# Patient Record
Sex: FEMALE | URBAN - METROPOLITAN AREA
[De-identification: names, ages, dates, MRNs, and addresses within clinical notes are randomized per-mention and may not be internally consistent; named-entity substitution may affect disease eponyms.]

---

## 2022-08-19 ENCOUNTER — EMERGENCY (EMERGENCY)
Facility: HOSPITAL | Age: 42
LOS: 1 days | Discharge: HOME | End: 2022-08-19
Attending: EMERGENCY MEDICINE | Admitting: EMERGENCY MEDICINE
Payer: MEDICAID

## 2022-08-19 VITALS
RESPIRATION RATE: 18 BRPM | TEMPERATURE: 98 F | OXYGEN SATURATION: 97 % | DIASTOLIC BLOOD PRESSURE: 75 MMHG | SYSTOLIC BLOOD PRESSURE: 110 MMHG | HEART RATE: 60 BPM

## 2022-08-19 VITALS
TEMPERATURE: 98 F | RESPIRATION RATE: 18 BRPM | HEIGHT: 70 IN | OXYGEN SATURATION: 98 % | WEIGHT: 264.11 LBS | HEART RATE: 64 BPM | SYSTOLIC BLOOD PRESSURE: 126 MMHG | DIASTOLIC BLOOD PRESSURE: 86 MMHG

## 2022-08-19 PROCEDURE — 99283 EMERGENCY DEPT VISIT LOW MDM: CPT | Mod: 25

## 2022-08-19 PROCEDURE — 73080 X-RAY EXAM OF ELBOW: CPT | Mod: 26,LT

## 2022-08-19 PROCEDURE — 99284 EMERGENCY DEPT VISIT MOD MDM: CPT

## 2022-08-19 PROCEDURE — 73080 X-RAY EXAM OF ELBOW: CPT

## 2022-08-19 RX ORDER — IBUPROFEN 200 MG
600 TABLET ORAL ONCE
Refills: 0 | Status: COMPLETED | OUTPATIENT
Start: 2022-08-19 | End: 2022-08-19

## 2022-08-19 RX ADMIN — Medication 600 MILLIGRAM(S): at 09:50

## 2022-08-19 RX ADMIN — Medication 600 MILLIGRAM(S): at 10:35

## 2022-08-19 NOTE — ED ADULT NURSE NOTE - NSIMPLEMENTINTERV_GEN_ALL_ED
Implemented All Fall Risk Interventions:  [place] to call system. Call bell, personal items and telephone within reach. Instruct patient to call for assistance. Room bathroom lighting operational. Non-slip footwear when patient is off stretcher. Physically safe environment: no spills, clutter or unnecessary equipment. Stretcher in lowest position, wheels locked, appropriate side rails in place. Provide visual cue, wrist band, yellow gown, etc. Monitor gait and stability. Monitor for mental status changes and reorient to person, place, and time. Review medications for side effects contributing to fall risk. Reinforce activity limits and safety measures with patient and family.

## 2022-08-19 NOTE — ED PROVIDER NOTE - CHPI ED SYMPTOMS NEG
no abrasion/no back pain/no numbness/no stiffness/no tingling/no weakness/no difficulty bearing weight

## 2022-08-19 NOTE — ED PROVIDER NOTE - ENMT, MLM
Airway patent, Nasal mucosa clear. Mouth with normal mucosa. Throat has no vesicles, no oropharyngeal exudates and uvula is midline. Scalp is atraumatic nontender.

## 2022-08-19 NOTE — ED PROVIDER NOTE - MUSCULOSKELETAL, MLM
Minor tenderness left elbow, no bony deformity.  Full range of motion pulses and sensation intact.  Remainder of exam is unremarkable

## 2022-08-19 NOTE — ED PROVIDER NOTE - OBJECTIVE STATEMENT
42-year-old female complaining of left elbow injury after a fall today.  States she tripped over her own feet.  Denies other injury or symptom.

## 2022-08-19 NOTE — ED PROVIDER NOTE - NSFOLLOWUPINSTRUCTIONS_ED_ALL_ED_FT
Elbow Fracture    WHAT YOU NEED TO KNOW:    An elbow fracture is a break in one or more of the 3 bones that form your elbow joint. Osteoporosis (brittle bones) can increase your risk for an elbow fracture.  Adult Arm Bones         DISCHARGE INSTRUCTIONS:    Return to the emergency department if:   •Your skin becomes swollen, cold, or pale.      •Your elbow, hand, or fingers are numb.      Call your doctor if:   •You have a fever.      •The pain gets worse, even after you rest and take your medicine.      •You have new or more trouble moving your arm.      •You have new sores around the area of your brace, splint, or cast.      •Your brace, splint, or cast becomes damaged.      •You have questions or concerns about your condition or care.      Medicines: You may need any of the following:   •Prescription pain medicine may be given. Ask your healthcare provider how to take this medicine safely. Some prescription pain medicines contain acetaminophen. Do not take other medicines that contain acetaminophen without talking to your healthcare provider. Too much acetaminophen may cause liver damage. Prescription pain medicine may cause constipation. Ask your healthcare provider how to prevent or treat constipation.       •NSAIDs, such as ibuprofen, help decrease swelling, pain, and fever. This medicine is available with or without a doctor's order. NSAIDs can cause stomach bleeding or kidney problems in certain people. If you take blood thinner medicine, always ask your healthcare provider if NSAIDs are safe for you. Always read the medicine label and follow directions.      •Take your medicine as directed. Contact your healthcare provider if you think your medicine is not helping or if you have side effects. Tell him or her if you are allergic to any medicine. Keep a list of the medicines, vitamins, and herbs you take. Include the amounts, and when and why you take them. Bring the list or the pill bottles to follow-up visits. Carry your medicine list with you in case of an emergency.      Self-care:   •Elevate your elbow above the level of your heart as often as you can. This will help decrease swelling and pain. Prop your elbow on pillows or blankets to keep it elevated comfortably. While your elbow is elevated, wiggle your fingers and open and close them to prevent hand stiffness.             •Apply ice on your elbow on your elbow for 15 to 20 minutes every hour or as directed. Use an ice pack, or put crushed ice in a plastic bag. Cover it with a towel. Ice helps prevent tissue damage and decreases swelling and pain.      •Go to physical therapy as directed. A physical therapist can teach you exercises to help improve movement and strength and to decrease pain.      Care for your brace, cast, or splint: Follow instructions about when you may take a bath or shower. It is important not to get your brace, cast, or splint wet. Cover your device with a plastic bag before you bathe. Tape the bag to your skin above the device to help keep out water. Hold your elbow away from the water in case the bag breaks.  •Check the skin around your cast, brace, or splint daily for any redness or open skin.      •Do not use a sharp or pointed object to scratch your skin under the cast, brace, or splint.      •Do not remove your brace or splint unless directed.      Follow up with your doctor as directed: You may need to see a specialist. You may need more x-rays and treatment. Write down your questions so you remember to ask them during your visits.

## 2022-08-19 NOTE — ED ADULT TRIAGE NOTE - CHIEF COMPLAINT QUOTE
tripped and fell injured left elbow right hand dominant. ring removed and given to pt and placed on other hand

## 2022-08-19 NOTE — ED PROVIDER NOTE - CARE PROVIDER_API CALL
Jeremiah Sanchez (DO)  Orthopaedic Surgery  [address] [place] [address]  Phone: [phone]  Fax: [phone]  Follow Up Time: 1-3 Days

## 2022-08-19 NOTE — ED ADULT NURSE NOTE - OBJECTIVE STATEMENT
42 YOF A&OX3 presents to ED for left elbow and right knee injury s/p fall. pt states tripped over her feet. pt rates left elbow pain 7/10. no numbness/tingling of extremities. pulses present in all 4 extremities, skin clean dry and normal for race. fall precautions in place. safety maintained.

## 2022-08-19 NOTE — ED PROVIDER NOTE - CLINICAL SUMMARY MEDICAL DECISION MAKING FREE TEXT BOX
42 female fell at home with left elbow injury.  Rule out fracture.  Plan x-ray, ice pack, sling, Ortho follow-up.

## 2022-08-20 ENCOUNTER — APPOINTMENT (OUTPATIENT)
Dept: ORTHOPEDIC SURGERY | Facility: CLINIC | Age: 42
End: 2022-08-20

## 2022-08-20 VITALS — HEIGHT: 70 IN | BODY MASS INDEX: 31.5 KG/M2 | WEIGHT: 220 LBS

## 2022-08-20 DIAGNOSIS — S52.125A NONDISPLACED FRACTURE OF HEAD OF LEFT RADIUS, INITIAL ENCOUNTER FOR CLOSED FRACTURE: ICD-10-CM

## 2022-08-20 DIAGNOSIS — Z78.9 OTHER SPECIFIED HEALTH STATUS: ICD-10-CM

## 2022-08-20 PROBLEM — Z00.00 ENCOUNTER FOR PREVENTIVE HEALTH EXAMINATION: Status: ACTIVE | Noted: 2022-08-20

## 2022-08-20 PROCEDURE — 99203 OFFICE O/P NEW LOW 30 MIN: CPT | Mod: 25

## 2022-08-20 PROCEDURE — L4350: CPT

## 2022-08-20 NOTE — HISTORY OF PRESENT ILLNESS
[9] : 9 [5] : 5 [de-identified] : 08/20/2022: 42 year old RHD female here for eval of Left elbow pain since yesterday. Braced fall with outstretched hand. Went to [place] ED who told her she had a radial head fx. Managing pain well with OTC meds. Denies weakness/ numbness/ tingling distally.  [FreeTextEntry5] : pt c.o pain in lt elbow, states she fell yesterday on her palms. pt went to [facility] and got xrays and positive for ffx

## 2022-08-20 NOTE — ASSESSMENT
[FreeTextEntry1] : Reviewed Xrays, discussed diagnosis, answered questions\par Reassurance, Cryotherapy, activity mod, rest\par Continue Sling \par FU with Hand 1 week

## 2022-08-20 NOTE — PHYSICAL EXAM
[Left] : left elbow [Pain with Flexion] : pain with flexion [Pain with Extension] : pain with extension [Pain with Supination] : pain with supination [] : light touch intact

## 2022-09-06 ENCOUNTER — APPOINTMENT (OUTPATIENT)
Dept: ORTHOPEDIC SURGERY | Facility: CLINIC | Age: 42
End: 2022-09-06

## 2022-09-06 VITALS — WEIGHT: 220 LBS | BODY MASS INDEX: 31.5 KG/M2 | HEIGHT: 70 IN

## 2022-09-06 PROCEDURE — 99203 OFFICE O/P NEW LOW 30 MIN: CPT

## 2022-09-06 RX ORDER — CYCLOBENZAPRINE HYDROCHLORIDE 5 MG/1
5 TABLET, FILM COATED ORAL
Qty: 20 | Refills: 0 | Status: ACTIVE | COMMUNITY
Start: 2022-09-06 | End: 1900-01-01

## 2022-09-06 NOTE — HISTORY OF PRESENT ILLNESS
[Mid-back] : mid-back [Lower back] : lower back [Dull/Aching] : dull/aching [Radiating] : radiating [Shooting] : shooting [Tingling] : tingling [Constant] : constant [Not working due to injury] : Work status: not working due to injury [de-identified] : 9/6/22: Patient is a 43 yo female c/o neck and low back pain which radiates down her arms and legs for 2 weeks after she fell. Taking motrin and tylenol as needed for pain. Pain is worse with activity. No previous injuries or surgeries to neck or low back.  [] : Post Surgical Visit: no [FreeTextEntry5] : patient fell down 2 weeks ago, broke her elbow, \par now complains of back pain that radiates down the legs and arms, has numbness and tingling

## 2022-09-06 NOTE — PHYSICAL EXAM
[Straightening consistent with spasm] : Straightening consistent with spasm [No bony abnormalities] : No bony abnormalities [There are no fractures, subluxations or dislocations. No significant abnormalities are seen] : There are no fractures, subluxations or dislocations. No significant abnormalities are seen [] : non-antalgic

## 2022-09-06 NOTE — ASSESSMENT
[FreeTextEntry1] : Xrays reviewed with patient\par Treatment options discussed\par Ibuprofen 800 sent for pain and inflammation\par Flexeril sent for spasm \par Recommend course of PT/HEP\par Follow up in 4 weeks with spine specialist

## 2022-10-10 ENCOUNTER — APPOINTMENT (OUTPATIENT)
Dept: ORTHOPEDIC SURGERY | Facility: CLINIC | Age: 42
End: 2022-10-10

## 2022-10-11 ENCOUNTER — APPOINTMENT (OUTPATIENT)
Dept: ORTHOPEDIC SURGERY | Facility: CLINIC | Age: 42
End: 2022-10-11

## 2022-10-11 PROCEDURE — 99204 OFFICE O/P NEW MOD 45 MIN: CPT

## 2022-10-11 NOTE — ASSESSMENT
[FreeTextEntry1] : LUE radic with abnormal reflexes\par MRI to eval for HNP and degree of stenosis\par MRI lumbar spine to eval for HNP, degree of stenosis\par C/w PT\par Trial jeannette\par Gabapentin- Patient advised of sedating effects, instructed not to drive, operate machinery, or take with other sedating medications. Advised of need to taper on/off medication and risk of abruptly stopping gabapentin. \par NSAIDs- Patient warned of risk of medication to GI tract, increased blood pressure, cardiac risk, and risk of fluid retention.  Advised to clear medication with internist or PCP if any concurrent health problem with heart, blood pressure, or GI system exists.

## 2022-10-11 NOTE — HISTORY OF PRESENT ILLNESS
[Sudden] : sudden [9] : 9 [3] : 3 [Dull/Aching] : dull/aching [Sharp] : sharp [Shooting] : shooting [Tingling] : tingling [de-identified] : Pt seen by non-spine partners in the past \par \par IAN Mcdonald note 9/6/22: "Patient is a 41 yo female c/o neck and low back pain which radiates down her arms and legs for 2 weeks after she fell. Taking motrin and tylenol as needed for pain. Pain is worse with activity. No previous injuries or surgeries to neck or low back."\par \par ____________\par \par 10/11/22- RHDF. Had a fall in August, seen in ED and then UC, dx with L radial neck fx. Then developed pain and N/T down the LUE and the LLE. No bb dysfunction. Patient was RXed  and cyclobenzaprine at [facility] and has been doing PT.  [] : no [FreeTextEntry1] : back  [FreeTextEntry6] : numbness  [FreeTextEntry7] : down the left arm and left leg  [de-identified] : IAN Cardoza [de-identified] : x-ray

## 2022-10-11 NOTE — IMAGING
[de-identified] : CSPINE\par Inspection: No rash or ecchymosis\par Palpation: No spasm in traps, rhomboids, paracervicals\par ROM: diminished all planes\par Strength: 5/5 bilateral deltoid, biceps, triceps, wrist flexors, wrist extensors, , abductors\par Sensation: Sensation present to light touch bilateral C5-T1 distributions, altered L dorsoradial forearm to radial digits\par Reflexes: + Woodson's bilaterally \par \par L elbow: TTP radial neck though with full motion\par \par LSPINE\par Palpation: TTP L lumbar paraspinals\par ROM: Full with stiffness\par Strength: 5/5 bilateral hip flexors, knee extensors, ankle dorsiflexors, EHL, ankle plantarflexors\par Sensation: Sensation present to light touch bilateral L2-S1 distributions\par Provocative maneuvers: Negative R straight leg raise; + L SLR\par \par Bilateral hips-\par Palpation: No tenderness to palpation over greater trochanter or IT band\par ROM: No pain with flexion and internal rotation  [Straightening consistent with spasm] : Straightening consistent with spasm [Disc space narrowing] : Disc space narrowing [AP] : anteroposterior [There are no fractures, subluxations or dislocations. No significant abnormalities are seen] : There are no fractures, subluxations or dislocations. No significant abnormalities are seen

## 2022-10-16 ENCOUNTER — FORM ENCOUNTER (OUTPATIENT)
Age: 42
End: 2022-10-16

## 2022-10-17 ENCOUNTER — APPOINTMENT (OUTPATIENT)
Dept: MRI IMAGING | Facility: CLINIC | Age: 42
End: 2022-10-17

## 2022-10-17 PROCEDURE — 72148 MRI LUMBAR SPINE W/O DYE: CPT

## 2022-10-17 PROCEDURE — 72141 MRI NECK SPINE W/O DYE: CPT

## 2022-10-18 ENCOUNTER — TRANSCRIPTION ENCOUNTER (OUTPATIENT)
Age: 42
End: 2022-10-18

## 2022-11-04 ENCOUNTER — APPOINTMENT (OUTPATIENT)
Dept: ORTHOPEDIC SURGERY | Facility: CLINIC | Age: 42
End: 2022-11-04

## 2022-11-04 DIAGNOSIS — M54.12 RADICULOPATHY, CERVICAL REGION: ICD-10-CM

## 2022-11-04 DIAGNOSIS — M54.16 RADICULOPATHY, LUMBAR REGION: ICD-10-CM

## 2022-11-04 PROCEDURE — 99214 OFFICE O/P EST MOD 30 MIN: CPT

## 2022-11-04 RX ORDER — GABAPENTIN 100 MG/1
100 CAPSULE ORAL
Qty: 60 | Refills: 2 | Status: ACTIVE | COMMUNITY
Start: 2022-10-11 | End: 1900-01-01

## 2022-11-04 NOTE — ASSESSMENT
[FreeTextEntry1] : C6/7 HNP with b/l NF narrowing\par \par LUE radic with abnormal reflexes\par bulging L4/5, L5/S1 with mild b/l NF narrowing\par \par Pain consult\par C/w PT / medication\par \par Gabapentin- Patient advised of sedating effects, instructed not to drive, operate machinery, or take with other sedating medications. Advised of need to taper on/off medication and risk of abruptly stopping gabapentin. \par NSAIDs- Patient warned of risk of medication to GI tract, increased blood pressure, cardiac risk, and risk of fluid retention.  Advised to clear medication with internist or PCP if any concurrent health problem with heart, blood pressure, or GI system exists. \par \par Entered by Amber Perez acting as scribe.

## 2022-11-04 NOTE — HISTORY OF PRESENT ILLNESS
[Sudden] : sudden [9] : 9 [3] : 3 [Dull/Aching] : dull/aching [Sharp] : sharp [Shooting] : shooting [Tingling] : tingling [de-identified] : C MRI 10/17/22  - report noted in chart. \par C2-C3: There is no posterior disc herniation.\par C3-C4: There is slight posterior disc bulging asymmetric towards the right.\par C4-C5: There is no posterior disc herniation.\par C5-C6: There is mild broad-based posterior disc bulging, mild bilateral foraminal narrowing right greater than \par left.\par C6-C7: There is a posterior central protrusion, diffuse disc bulging and right foraminal protrusion encroaching \par upon the right exiting C7 nerve root with right greater than left foraminal narrowing. There is slight disc \par bulging in the visualized upper thoracic spine.\par Ind. review-\par C6/7 HNP with b/l NF narrowing\par \par L MRI 10/17/22  - report noted in chart. \par L1-L2: There is no posterior disc herniation.\par L2-L3: There is no posterior disc herniation.\par L3-L4: There is no posterior disc herniation.\par L4-L5: There is diffuse disc bulging, facet hypertrophy, and bilateral foraminal herniations encroaching upon \par the right greater than left exiting L4 nerve roots.\par L5-S1: There is disc bulging, bony ridging, facet arthrosis, and moderate bilateral foraminal narrowing with \par encroachment upon right greater than left exiting L5 nerve roots.\par Ind. review- \par bulging L4/5, L5/S1 with mild b/l NF narrowing\par \par Pt seen by non-spine partners in the past \par \par IAN Mcdonald note 9/6/22: "Patient is a 41 yo female c/o neck and low back pain which radiates down her arms and legs for 2 weeks after she fell. Taking motrin and tylenol as needed for pain. Pain is worse with activity. No previous injuries or surgeries to neck or low back."\par \par ____________\par \par 10/11/22- RHDF. Had a fall in August, seen in ED and then UC, dx with L radial neck fx. Then developed pain and N/T down the LUE and the LLE. No bb dysfunction. Patient was RXed  and cyclobenzaprine at [facility] and has been doing PT. \par 11/4/22: MRI review\par \par  [] : no [FreeTextEntry1] : back  [FreeTextEntry6] : numbness  [FreeTextEntry7] : down the left arm and left leg  [de-identified] : IAN Cardoza [de-identified] : x-ray  [de-identified] : MRI, PT

## 2022-11-04 NOTE — IMAGING
[Straightening consistent with spasm] : Straightening consistent with spasm [Disc space narrowing] : Disc space narrowing [AP] : anteroposterior [There are no fractures, subluxations or dislocations. No significant abnormalities are seen] : There are no fractures, subluxations or dislocations. No significant abnormalities are seen [de-identified] : CSPINE\par Inspection: No rash or ecchymosis\par Palpation: No spasm in traps, rhomboids, paracervicals\par ROM: diminished all planes\par Strength: 5/5 bilateral deltoid, biceps, triceps, wrist flexors, wrist extensors, , abductors\par Sensation: Sensation present to light touch bilateral C5-T1 distributions, altered L dorsoradial forearm to radial digits\par Reflexes: + Woodson's bilaterally \par \par L elbow: TTP radial neck though with full motion\par \par LSPINE\par Palpation: TTP L lumbar paraspinals\par ROM: Full with stiffness\par Strength: 5/5 bilateral hip flexors, knee extensors, ankle dorsiflexors, EHL, ankle plantarflexors\par Sensation: Sensation present to light touch bilateral L2-S1 distributions\par Provocative maneuvers: Negative R straight leg raise; + L SLR\par \par Bilateral hips-\par Palpation: No tenderness to palpation over greater trochanter or IT band\par ROM: No pain with flexion and internal rotation

## 2022-11-15 ENCOUNTER — APPOINTMENT (OUTPATIENT)
Dept: PAIN MANAGEMENT | Facility: CLINIC | Age: 42
End: 2022-11-15

## 2022-12-15 RX ORDER — IBUPROFEN 800 MG/1
800 TABLET, FILM COATED ORAL 3 TIMES DAILY
Qty: 60 | Refills: 0 | Status: ACTIVE | COMMUNITY
Start: 2022-09-06 | End: 1900-01-01

## 2022-12-16 ENCOUNTER — APPOINTMENT (OUTPATIENT)
Dept: ORTHOPEDIC SURGERY | Facility: CLINIC | Age: 42
End: 2022-12-16

## 2024-03-18 RX ORDER — FOLIC ACID 0.8 MG
1 TABLET ORAL
Qty: 0 | Refills: 0 | DISCHARGE

## 2024-03-18 RX ORDER — CHOLECALCIFEROL (VITAMIN D3) 125 MCG
0 CAPSULE ORAL
Qty: 0 | Refills: 0 | DISCHARGE

## 2024-03-18 RX ORDER — SERTRALINE 25 MG/1
1 TABLET, FILM COATED ORAL
Qty: 0 | Refills: 0 | DISCHARGE